# Patient Record
Sex: FEMALE | Race: WHITE | NOT HISPANIC OR LATINO | URBAN - METROPOLITAN AREA
[De-identification: names, ages, dates, MRNs, and addresses within clinical notes are randomized per-mention and may not be internally consistent; named-entity substitution may affect disease eponyms.]

---

## 2018-07-02 ENCOUNTER — OUTPATIENT (OUTPATIENT)
Dept: OUTPATIENT SERVICES | Facility: HOSPITAL | Age: 58
LOS: 1 days | End: 2018-07-02

## 2018-07-02 VITALS
RESPIRATION RATE: 14 BRPM | TEMPERATURE: 97 F | WEIGHT: 141.98 LBS | DIASTOLIC BLOOD PRESSURE: 64 MMHG | OXYGEN SATURATION: 97 % | HEIGHT: 66 IN | HEART RATE: 73 BPM | SYSTOLIC BLOOD PRESSURE: 112 MMHG

## 2018-07-02 DIAGNOSIS — Z98.890 OTHER SPECIFIED POSTPROCEDURAL STATES: Chronic | ICD-10-CM

## 2018-07-02 DIAGNOSIS — K41.30 UNILATERAL FEMORAL HERNIA, WITH OBSTRUCTION, WITHOUT GANGRENE, NOT SPECIFIED AS RECURRENT: ICD-10-CM

## 2018-07-02 DIAGNOSIS — Z90.710 ACQUIRED ABSENCE OF BOTH CERVIX AND UTERUS: Chronic | ICD-10-CM

## 2018-07-02 DIAGNOSIS — K41.90 UNILATERAL FEMORAL HERNIA, WITHOUT OBSTRUCTION OR GANGRENE, NOT SPECIFIED AS RECURRENT: ICD-10-CM

## 2018-07-02 LAB
HCT VFR BLD CALC: 32.5 % — LOW (ref 34.5–45)
HGB BLD-MCNC: 10.8 G/DL — LOW (ref 11.5–15.5)
MCHC RBC-ENTMCNC: 28.8 PG — SIGNIFICANT CHANGE UP (ref 27–34)
MCHC RBC-ENTMCNC: 33.2 % — SIGNIFICANT CHANGE UP (ref 32–36)
MCV RBC AUTO: 86.7 FL — SIGNIFICANT CHANGE UP (ref 80–100)
NRBC # FLD: 0 — SIGNIFICANT CHANGE UP
PLATELET # BLD AUTO: 365 K/UL — SIGNIFICANT CHANGE UP (ref 150–400)
PMV BLD: 9.5 FL — SIGNIFICANT CHANGE UP (ref 7–13)
RBC # BLD: 3.75 M/UL — LOW (ref 3.8–5.2)
RBC # FLD: 13.8 % — SIGNIFICANT CHANGE UP (ref 10.3–14.5)
WBC # BLD: 6.81 K/UL — SIGNIFICANT CHANGE UP (ref 3.8–10.5)
WBC # FLD AUTO: 6.81 K/UL — SIGNIFICANT CHANGE UP (ref 3.8–10.5)

## 2018-07-02 RX ORDER — LETROZOLE 2.5 MG/1
1 TABLET, FILM COATED ORAL
Qty: 0 | Refills: 0 | COMMUNITY

## 2018-07-02 NOTE — H&P PST ADULT - PROBLEM SELECTOR PLAN 1
Right Femoral Hernia Repair scheduled on 7/12/18.   Pre-op instructions provided. Pt verbalized understanding.   Pt to take own medication for GI ppx.  Chlorhexidine wash provided and instructions given. Right Femoral Hernia Repair scheduled on 7/12/18.   Pre-op instructions provided. Pt verbalized understanding.   Pt to take own medication for GI ppx.  Chlorhexidine wash provided and instructions given.  Pt states she is going for medical clearance per surgeon's request.

## 2018-07-02 NOTE — H&P PST ADULT - NSANTHOSAYNRD_GEN_A_CORE
No. ERIN screening performed.  STOP BANG Legend: 0-2 = LOW Risk; 3-4 = INTERMEDIATE Risk; 5-8 = HIGH Risk

## 2018-07-02 NOTE — H&P PST ADULT - PMH
Breast cancer  right 2015, s/p surgery and radiation  Incarcerated inguinal hernia, left 5/2013    Lumbar herniated disc

## 2018-07-02 NOTE — H&P PST ADULT - PSH
H/O abdominal hysterectomy  for prolapsed uterus  H/O lumpectomy  2015  H/O right inguinal hernia repair  2013  S/P inguinal hernia repair, open 5/11/2013 left

## 2018-07-02 NOTE — H&P PST ADULT - NEGATIVE ENMT SYMPTOMS
no ear pain/no tinnitus/no hearing difficulty/no sinus symptoms/no vertigo/no throat pain/no dysphagia

## 2018-07-02 NOTE — H&P PST ADULT - FAMILY HISTORY
Father  Still living? No  Family history of esophageal cancer, Age at diagnosis: Age Unknown     Mother  Still living? No  Family history of breast cancer, Age at diagnosis: Age Unknown

## 2018-07-02 NOTE — H&P PST ADULT - MUSCULOSKELETAL
details… detailed exam ROM intact/no joint swelling/no joint erythema/no calf tenderness/normal strength/no joint warmth

## 2018-07-02 NOTE — H&P PST ADULT - HISTORY OF PRESENT ILLNESS
57 year old female with c/o lump to right groin region in 3/2018, pt found to have femoral hernia. Pt presents today for presurgical evaluation for .. 57 year old female with c/o lump to right groin region in 3/2018, pt found to have femoral hernia. Pt presents today for presurgical evaluation for Right Femoral Hernia Repair scheduled on 7/12/18.

## 2018-07-11 ENCOUNTER — TRANSCRIPTION ENCOUNTER (OUTPATIENT)
Age: 58
End: 2018-07-11

## 2018-07-12 ENCOUNTER — OUTPATIENT (OUTPATIENT)
Dept: OUTPATIENT SERVICES | Facility: HOSPITAL | Age: 58
LOS: 1 days | Discharge: HOME | End: 2018-07-12

## 2018-07-12 VITALS
HEART RATE: 59 BPM | SYSTOLIC BLOOD PRESSURE: 124 MMHG | OXYGEN SATURATION: 100 % | DIASTOLIC BLOOD PRESSURE: 71 MMHG | HEIGHT: 66 IN | RESPIRATION RATE: 16 BRPM | TEMPERATURE: 98 F | WEIGHT: 141.98 LBS

## 2018-07-12 VITALS
DIASTOLIC BLOOD PRESSURE: 54 MMHG | TEMPERATURE: 97 F | RESPIRATION RATE: 16 BRPM | HEART RATE: 55 BPM | SYSTOLIC BLOOD PRESSURE: 102 MMHG | OXYGEN SATURATION: 100 %

## 2018-07-12 DIAGNOSIS — Z90.710 ACQUIRED ABSENCE OF BOTH CERVIX AND UTERUS: Chronic | ICD-10-CM

## 2018-07-12 DIAGNOSIS — K41.30 UNILATERAL FEMORAL HERNIA, WITH OBSTRUCTION, WITHOUT GANGRENE, NOT SPECIFIED AS RECURRENT: ICD-10-CM

## 2018-07-12 DIAGNOSIS — Z98.890 OTHER SPECIFIED POSTPROCEDURAL STATES: Chronic | ICD-10-CM

## 2018-07-12 RX ORDER — SODIUM CHLORIDE 9 MG/ML
1000 INJECTION, SOLUTION INTRAVENOUS
Qty: 0 | Refills: 0 | Status: DISCONTINUED | OUTPATIENT
Start: 2018-07-12 | End: 2018-07-13

## 2018-07-12 RX ORDER — OXYCODONE AND ACETAMINOPHEN 5; 325 MG/1; MG/1
1 TABLET ORAL
Qty: 25 | Refills: 0
Start: 2018-07-12

## 2018-07-12 NOTE — BRIEF OPERATIVE NOTE - PROCEDURE
<<-----Click on this checkbox to enter Procedure Femoral hernia repair  07/12/2018  open with mesh plug  Active  RASHMI

## 2018-07-12 NOTE — ASU DISCHARGE PLAN (ADULT/PEDIATRIC). - BATHING
shower only shower only/After 24 hours until follow up with MD. No tub baths or submerging incision in water

## 2018-07-12 NOTE — BRIEF OPERATIVE NOTE - POST-OP DX
Femoral hernia of right side without obstruction or gangrene  07/12/2018    Active  Sarahy Jorge Non-recurrent unilateral femoral hernia with obstruction without gangrene  07/12/2018    Active  Sarahy Jorge

## 2018-07-12 NOTE — ASU DISCHARGE PLAN (ADULT/PEDIATRIC). - SPECIAL INSTRUCTIONS
You may shower with steri strips on. After showering, pat dry steri strips.  Do not rub them.  They will curl up and fall off by themselves within 7 days. Avoid using creams, lotions, or ointments around the steri strip area.

## 2018-07-12 NOTE — ASU DISCHARGE PLAN (ADULT/PEDIATRIC). - LEAVE STERI-STRIP INTACT
I spoke to the patient. The prescription for her glasses was mailed today to the patient's address.  
Statement Selected

## 2018-07-12 NOTE — ASU DISCHARGE PLAN (ADULT/PEDIATRIC). - NURSING INSTRUCTIONS
You were given IV Tylenol (1000mg) for pain management in the Operating Room.  Please do NOT take tylenol/acetaminophen products (Percocet, Vicodin, Excedrin) for the next 6-8 hours (after  445PM ).  When taking pain/narcotic medicationss - take with food and know it may cause constipation. To prevent constipation increase fluids and fiber in diet. - Do NOT drive while on narcotics.   Avoid taking aspirin or ibuprophen (Motrin, Aleve, advil) until follow up with MD.  You were given IV antibiotics in the OR. IF you are prescribed oral antibiotics to take at home. Please follow directions on the bottle and start your first dose of antibiotics before bedtime, and schedule your own timing until you finish all antibiotics. DO NOT DISCONTINUE ON YOUR OWN.  Refer to medication reconcillation sheet attached with discharge instruction paperwork.

## 2018-07-12 NOTE — ASU DISCHARGE PLAN (ADULT/PEDIATRIC). - NOTIFY
Bleeding that does not stop Unable to Urinate/Bleeding that does not stop/Swelling that continues/Numbness, color, or temperature change to extremity/Fever greater than 101/Numbness, tingling/Pain not relieved by Medications/Signs of infection: redness around surgical site, hot and tender to the touch, pus drainage of any kind accompanied by foul odor

## 2018-07-12 NOTE — BRIEF OPERATIVE NOTE - PRE-OP DX
Femoral hernia of right side without obstruction or gangrene  07/12/2018    Active  Sarahy Jorge Unilateral recurrent femoral hernia with obstruction but no gangrene  07/12/2018    Active  Sarahy Jorge

## 2019-07-10 PROBLEM — C50.919 MALIGNANT NEOPLASM OF UNSPECIFIED SITE OF UNSPECIFIED FEMALE BREAST: Chronic | Status: ACTIVE | Noted: 2018-07-02

## 2019-08-05 ENCOUNTER — APPOINTMENT (OUTPATIENT)
Dept: INTERNAL MEDICINE | Facility: CLINIC | Age: 59
End: 2019-08-05
Payer: COMMERCIAL

## 2019-08-05 VITALS
WEIGHT: 143 LBS | HEIGHT: 66 IN | BODY MASS INDEX: 22.98 KG/M2 | HEART RATE: 69 BPM | TEMPERATURE: 98.2 F | OXYGEN SATURATION: 98 %

## 2019-08-05 VITALS — SYSTOLIC BLOOD PRESSURE: 114 MMHG | DIASTOLIC BLOOD PRESSURE: 70 MMHG

## 2019-08-05 DIAGNOSIS — Z78.9 OTHER SPECIFIED HEALTH STATUS: ICD-10-CM

## 2019-08-05 DIAGNOSIS — Z80.3 FAMILY HISTORY OF MALIGNANT NEOPLASM OF BREAST: ICD-10-CM

## 2019-08-05 DIAGNOSIS — Z82.49 FAMILY HISTORY OF ISCHEMIC HEART DISEASE AND OTHER DISEASES OF THE CIRCULATORY SYSTEM: ICD-10-CM

## 2019-08-05 DIAGNOSIS — Z80.0 FAMILY HISTORY OF MALIGNANT NEOPLASM OF DIGESTIVE ORGANS: ICD-10-CM

## 2019-08-05 PROCEDURE — 36415 COLL VENOUS BLD VENIPUNCTURE: CPT

## 2019-08-05 PROCEDURE — 93000 ELECTROCARDIOGRAM COMPLETE: CPT

## 2019-08-05 PROCEDURE — 99386 PREV VISIT NEW AGE 40-64: CPT | Mod: 25

## 2019-08-05 RX ORDER — CALCIUM CITRATE/VITAMIN D3 315MG-6.25
TABLET ORAL
Refills: 0 | Status: ACTIVE | COMMUNITY

## 2019-08-05 RX ORDER — LETROZOLE TABLETS 2.5 MG/1
2.5 TABLET, FILM COATED ORAL DAILY
Refills: 0 | Status: ACTIVE | COMMUNITY

## 2019-08-05 NOTE — REVIEW OF SYSTEMS
[Negative] : Heme/Lymph [FreeTextEntry6] : snore / some restless sleep [FreeTextEntry4] : atopic eczema/ uses elidyl [FreeTextEntry7] : had a colonoscopy has endoscopy  [FreeTextEntry8] : had vaginal prolapse,  [de-identified] : had melanoma eczema

## 2019-08-05 NOTE — HISTORY OF PRESENT ILLNESS
[de-identified] : doing well. watches her weight. 2md preg: baby non viable/ didn't know til gave birth. [FreeTextEntry1] : needs a new doctor

## 2019-08-05 NOTE — HEALTH RISK ASSESSMENT
[Good] : ~his/her~  mood as  good [Yes] : Yes [2 - 4 times a month (2 pts)] : 2-4 times a month (2 points) [1 or 2 (0 pts)] : 1 or 2 (0 points) [No] : In the past 12 months have you used drugs other than those required for medical reasons? No [No falls in past year] : Patient reported no falls in the past year [0] : 2) Feeling down, depressed, or hopeless: Not at all (0) [With Family] : lives with family [Employed] : employed [] :  [Feels Safe at Home] : Feels safe at home [Smoke Detector] : smoke detector [Carbon Monoxide Detector] : carbon monoxide detector [Seat Belt] :  uses seat belt [Safety elements used in home] : safety elements used in home [Sunscreen] : uses sunscreen [] : No [de-identified] : yes Oncologist  [de-identified] : walking  [[id number]] : 0 [Reports changes in hearing] : Reports no changes in hearing [Reports changes in vision] : Reports no changes in vision [Reports changes in dental health] : Reports no changes in dental health [Caregiver Concerns] : does not have caregiver concerns [TB Exposure] : is not being exposed to tuberculosis [PapSmearDate] : 07/19 [MammogramDate] : 08/18 [BoneDensityDate] : 01/17 [PapSmearComments] : pt stated wnl [ColonoscopyDate] : 01/18 [ColonoscopyComments] : repeats in in 5years

## 2019-08-05 NOTE — PHYSICAL EXAM
[Normal] : normal gait, coordination grossly intact, no focal deficits [FreeTextEntry1] : def [de-identified] : def

## 2019-08-06 LAB
ALBUMIN SERPL ELPH-MCNC: 4.4 G/DL
ALP BLD-CCNC: 41 U/L
ALT SERPL-CCNC: 14 U/L
ANION GAP SERPL CALC-SCNC: 12 MMOL/L
APPEARANCE: CLEAR
AST SERPL-CCNC: 22 U/L
BASOPHILS # BLD AUTO: 0.07 K/UL
BASOPHILS NFR BLD AUTO: 1 %
BILIRUB SERPL-MCNC: 0.3 MG/DL
BILIRUBIN URINE: NEGATIVE
BLOOD URINE: NEGATIVE
BUN SERPL-MCNC: 14 MG/DL
CALCIUM SERPL-MCNC: 9.7 MG/DL
CHLORIDE SERPL-SCNC: 104 MMOL/L
CHOLEST SERPL-MCNC: 204 MG/DL
CHOLEST/HDLC SERPL: 2.7 RATIO
CO2 SERPL-SCNC: 26 MMOL/L
COLOR: COLORLESS
CREAT SERPL-MCNC: 0.71 MG/DL
EOSINOPHIL # BLD AUTO: 0.29 K/UL
EOSINOPHIL NFR BLD AUTO: 4.2 %
ESTIMATED AVERAGE GLUCOSE: 114 MG/DL
GLUCOSE QUALITATIVE U: NEGATIVE
GLUCOSE SERPL-MCNC: 96 MG/DL
HBA1C MFR BLD HPLC: 5.6 %
HCT VFR BLD CALC: 42.2 %
HDLC SERPL-MCNC: 75 MG/DL
HGB BLD-MCNC: 12.8 G/DL
IMM GRANULOCYTES NFR BLD AUTO: 0.1 %
KETONES URINE: NEGATIVE
LDLC SERPL CALC-MCNC: 109 MG/DL
LEUKOCYTE ESTERASE URINE: NEGATIVE
LYMPHOCYTES # BLD AUTO: 2.26 K/UL
LYMPHOCYTES NFR BLD AUTO: 32.7 %
MAN DIFF?: NORMAL
MCHC RBC-ENTMCNC: 30.3 GM/DL
MCHC RBC-ENTMCNC: 31.8 PG
MCV RBC AUTO: 104.7 FL
MONOCYTES # BLD AUTO: 0.53 K/UL
MONOCYTES NFR BLD AUTO: 7.7 %
NEUTROPHILS # BLD AUTO: 3.76 K/UL
NEUTROPHILS NFR BLD AUTO: 54.3 %
NITRITE URINE: NEGATIVE
PH URINE: 7
PLATELET # BLD AUTO: 299 K/UL
POTASSIUM SERPL-SCNC: 4.6 MMOL/L
PROT SERPL-MCNC: 6.7 G/DL
PROTEIN URINE: NEGATIVE
RBC # BLD: 4.03 M/UL
RBC # FLD: 13.1 %
SODIUM SERPL-SCNC: 141 MMOL/L
SPECIFIC GRAVITY URINE: 1
TRIGL SERPL-MCNC: 99 MG/DL
TSH SERPL-ACNC: 0.83 UIU/ML
UROBILINOGEN URINE: NORMAL
WBC # FLD AUTO: 6.92 K/UL

## 2020-10-12 ENCOUNTER — NON-APPOINTMENT (OUTPATIENT)
Age: 60
End: 2020-10-12

## 2020-10-26 ENCOUNTER — APPOINTMENT (OUTPATIENT)
Dept: INTERNAL MEDICINE | Facility: CLINIC | Age: 60
End: 2020-10-26
Payer: COMMERCIAL

## 2020-10-26 VITALS
BODY MASS INDEX: 21.53 KG/M2 | DIASTOLIC BLOOD PRESSURE: 68 MMHG | HEIGHT: 66 IN | TEMPERATURE: 97.4 F | OXYGEN SATURATION: 98 % | WEIGHT: 134 LBS | HEART RATE: 68 BPM | SYSTOLIC BLOOD PRESSURE: 118 MMHG

## 2020-10-26 DIAGNOSIS — Z12.11 ENCOUNTER FOR SCREENING FOR MALIGNANT NEOPLASM OF COLON: ICD-10-CM

## 2020-10-26 DIAGNOSIS — Z78.9 OTHER SPECIFIED HEALTH STATUS: ICD-10-CM

## 2020-10-26 DIAGNOSIS — Z20.828 CONTACT WITH AND (SUSPECTED) EXPOSURE TO OTHER VIRAL COMMUNICABLE DISEASES: ICD-10-CM

## 2020-10-26 PROCEDURE — 36415 COLL VENOUS BLD VENIPUNCTURE: CPT

## 2020-10-26 PROCEDURE — 99396 PREV VISIT EST AGE 40-64: CPT | Mod: 25

## 2020-10-26 PROCEDURE — 99072 ADDL SUPL MATRL&STAF TM PHE: CPT

## 2020-10-26 NOTE — ASSESSMENT
[FreeTextEntry1] : 59 yr old presents for CPE \par \par #1 GERD- Continue PPI daily, Anti- reflux measures \par \par #2 Osteopenia- Continue Prolia , H/O FX \par Discussed possible Vacation? Speak to Specialist  Oncologist \par \par #3 H/O Breast CA- Importance of Screenings , mammo/ us \par Letrozole daily \par \par Healthy Lifestyle/ Health Screenings reviewed\par \par Labs done in office by MA/ Had Flu Vaccine already

## 2020-10-26 NOTE — HISTORY OF PRESENT ILLNESS
[FreeTextEntry1] : CPE/ Had Flu Vaccine  [de-identified] : 59 yr here for above. Reports being in good health at present. Had lower back pain in january 2020 and was seen orthopedist Dr. Calixto, had standup MRI and X-ray lumbar spine and was diagnosed with compression fracture. Did Physical therapy and took alleve for pain, Does not currently have pain.   Being followed by oncologist for Prolia shots. Diagnosed with stage 1 Breast Ca 5 years ago S/P lumpectomy and 35 rounds of radiation, currently on maintenance med Letrozole. Suffers from GERD and manages with Omeprazole.  Mammo and Sono done 3 weeks ago. Being followed by GI for GERD. Left ear feels clogged, at present. has a history of wax buildup and feels this is the case now. Being followed by dermatologist for skin checks. Received flu shot a week ago. Currently unemployed due to covid, mommy and me business.

## 2020-10-26 NOTE — HEALTH RISK ASSESSMENT
[Yes] : Yes [1 or 2 (0 pts)] : 1 or 2 (0 points) [Never (0 pts)] : Never (0 points) [No] : In the past 12 months have you used drugs other than those required for medical reasons? No [No falls in past year] : Patient reported no falls in the past year [0] : 2) Feeling down, depressed, or hopeless: Not at all (0) [Patient reported mammogram was normal] : Patient reported mammogram was normal [Patient reported bone density results were normal] : Patient reported bone density results were normal [Patient reported colonoscopy was normal] : Patient reported colonoscopy was normal [HIV test declined] : HIV test declined [Hepatitis C test offered] : Hepatitis C test offered [With Significant Other] : lives with significant other [Unemployed] : unemployed [] :  [Reports normal functional visual acuity (ie: able to read med bottle)] : Reports normal functional visual acuity [Smoke Detector] : smoke detector [Carbon Monoxide Detector] : carbon monoxide detector [Seat Belt] :  uses seat belt [Sunscreen] : uses sunscreen [Very Good] : ~his/her~ current health as very good [Good] : ~his/her~  mood as  good [4 or more  times a week (4 pts)] : 4 or more  times a week (4 points) [# Of Children ___] : has [unfilled] children [None] : None [College] : College [Sexually Active] : sexually active [Feels Safe at Home] : Feels safe at home [Fully functional (bathing, dressing, toileting, transferring, walking, feeding)] : Fully functional (bathing, dressing, toileting, transferring, walking, feeding) [Fully functional (using the telephone, shopping, preparing meals, housekeeping, doing laundry, using] : Fully functional and needs no help or supervision to perform IADLs (using the telephone, shopping, preparing meals, housekeeping, doing laundry, using transportation, managing medications and managing finances) [] : No [de-identified] : No [de-identified] : None [Audit-CScore] : 3 [de-identified] : Walking [de-identified] : Normal Diet  [Change in mental status noted] : No change in mental status noted [Language] : denies difficulty with language [Behavior] : denies difficulty with behavior [Learning/Retaining New Information] : denies difficulty learning/retaining new information [Handling Complex Tasks] : denies difficulty handling complex tasks [Reasoning] : denies difficulty with reasoning [Spatial Ability and Orientation] : denies difficulty with spatial ability and orientation [High Risk Behavior] : no high risk behavior [Reports changes in hearing] : Reports no changes in hearing [Reports changes in vision] : Reports no changes in vision [Guns at Home] : no guns at home [Travel to Developing Areas] : does not  travel to developing areas [TB Exposure] : is not being exposed to tuberculosis [Caregiver Concerns] : does not have caregiver concerns [MammogramDate] : 10/20 [BoneDensityDate] : 01/19 [ColonoscopyDate] : 01/18 [ColonoscopyComments] : Both EGD and Colonoscopy  [FreeTextEntry3] : 2

## 2020-10-26 NOTE — PHYSICAL EXAM
[No Carotid Bruits] : no carotid bruits [Pedal Pulses Present] : the pedal pulses are present [No Edema] : there was no peripheral edema [No Extremity Clubbing/Cyanosis] : no extremity clubbing/cyanosis [Declined Breast Exam] : declined breast exam  [Normal] : affect was normal and insight and judgment were intact [No Acute Distress] : no acute distress [Well Nourished] : well nourished [Well Developed] : well developed [Well-Appearing] : well-appearing [Normal Sclera/Conjunctiva] : normal sclera/conjunctiva [PERRL] : pupils equal round and reactive to light [EOMI] : extraocular movements intact [Normal Outer Ear/Nose] : the outer ears and nose were normal in appearance [Normal Oropharynx] : the oropharynx was normal [No JVD] : no jugular venous distention [No Lymphadenopathy] : no lymphadenopathy [Supple] : supple [Thyroid Normal, No Nodules] : the thyroid was normal and there were no nodules present [No Respiratory Distress] : no respiratory distress  [No Accessory Muscle Use] : no accessory muscle use [Clear to Auscultation] : lungs were clear to auscultation bilaterally [Normal Rate] : normal rate  [Regular Rhythm] : with a regular rhythm [Normal S1, S2] : normal S1 and S2 [No Murmur] : no murmur heard [No Abdominal Bruit] : a ~M bruit was not heard ~T in the abdomen [No Varicosities] : no varicosities [No Palpable Aorta] : no palpable aorta [Soft] : abdomen soft [Non Tender] : non-tender [Non-distended] : non-distended [No Masses] : no abdominal mass palpated [No HSM] : no HSM [Normal Bowel Sounds] : normal bowel sounds [Declined Rectal Exam] : declined rectal exam [Normal Posterior Cervical Nodes] : no posterior cervical lymphadenopathy [Normal Anterior Cervical Nodes] : no anterior cervical lymphadenopathy [No CVA Tenderness] : no CVA  tenderness [No Spinal Tenderness] : no spinal tenderness [No Joint Swelling] : no joint swelling [Grossly Normal Strength/Tone] : grossly normal strength/tone [No Rash] : no rash [No Skin Lesions] : no skin lesions [Coordination Grossly Intact] : coordination grossly intact [No Focal Deficits] : no focal deficits [Normal Gait] : normal gait [Deep Tendon Reflexes (DTR)] : deep tendon reflexes were 2+ and symmetric [Speech Grossly Normal] : speech grossly normal [Memory Grossly Normal] : memory grossly normal [Normal Affect] : the affect was normal [Alert and Oriented x3] : oriented to person, place, and time [Normal Mood] : the mood was normal [Normal Insight/Judgement] : insight and judgment were intact [de-identified] : left ear excessive earwax, unable to visualize TM, Right side normal TM seen [FreeTextEntry1] : FIT test with instr given  [de-identified] : sun exposed skin/  Has Derm

## 2020-10-26 NOTE — REVIEW OF SYSTEMS
[Hot Flashes] : hot flashes [Night Sweats] : night sweats [Heartburn] : heartburn [Negative] : Heme/Lymph [FreeTextEntry4] : clogged feeling in left ear [FreeTextEntry9] : stiffness in morning

## 2020-10-29 ENCOUNTER — TRANSCRIPTION ENCOUNTER (OUTPATIENT)
Age: 60
End: 2020-10-29

## 2020-11-02 LAB
25(OH)D3 SERPL-MCNC: 74.3 NG/ML
ALBUMIN SERPL ELPH-MCNC: 5 G/DL
ALP BLD-CCNC: 51 U/L
ALT SERPL-CCNC: 16 U/L
ANION GAP SERPL CALC-SCNC: 15 MMOL/L
AST SERPL-CCNC: 22 U/L
BASOPHILS # BLD AUTO: 0.11 K/UL
BASOPHILS NFR BLD AUTO: 1.4 %
BILIRUB SERPL-MCNC: 0.4 MG/DL
BUN SERPL-MCNC: 15 MG/DL
CALCIUM SERPL-MCNC: 10.3 MG/DL
CHLORIDE SERPL-SCNC: 104 MMOL/L
CHOLEST SERPL-MCNC: 239 MG/DL
CO2 SERPL-SCNC: 26 MMOL/L
CREAT SERPL-MCNC: 0.71 MG/DL
EOSINOPHIL # BLD AUTO: 0.21 K/UL
EOSINOPHIL NFR BLD AUTO: 2.7 %
ESTIMATED AVERAGE GLUCOSE: 108 MG/DL
GLUCOSE SERPL-MCNC: 98 MG/DL
HBA1C MFR BLD HPLC: 5.4 %
HCT VFR BLD CALC: 40.7 %
HDLC SERPL-MCNC: 90 MG/DL
HGB BLD-MCNC: 13 G/DL
IMM GRANULOCYTES NFR BLD AUTO: 0.1 %
LDLC SERPL CALC-MCNC: 130 MG/DL
LYMPHOCYTES # BLD AUTO: 2.56 K/UL
LYMPHOCYTES NFR BLD AUTO: 33 %
MAN DIFF?: NORMAL
MCHC RBC-ENTMCNC: 31.9 GM/DL
MCHC RBC-ENTMCNC: 31.9 PG
MCV RBC AUTO: 99.8 FL
MONOCYTES # BLD AUTO: 0.49 K/UL
MONOCYTES NFR BLD AUTO: 6.3 %
NEUTROPHILS # BLD AUTO: 4.38 K/UL
NEUTROPHILS NFR BLD AUTO: 56.5 %
NONHDLC SERPL-MCNC: 149 MG/DL
PLATELET # BLD AUTO: 327 K/UL
POTASSIUM SERPL-SCNC: 4.4 MMOL/L
PROT SERPL-MCNC: 7.3 G/DL
RBC # BLD: 4.08 M/UL
RBC # FLD: 12.6 %
SARS-COV-2 IGG SERPL IA-ACNC: 0.01 INDEX
SARS-COV-2 IGG SERPL QL IA: NEGATIVE
SODIUM SERPL-SCNC: 144 MMOL/L
TRIGL SERPL-MCNC: 94 MG/DL
TSH SERPL-ACNC: 0.98 UIU/ML
WBC # FLD AUTO: 7.76 K/UL

## 2022-03-15 ENCOUNTER — APPOINTMENT (OUTPATIENT)
Dept: INTERNAL MEDICINE | Facility: CLINIC | Age: 62
End: 2022-03-15
Payer: COMMERCIAL

## 2022-03-15 VITALS
HEIGHT: 66 IN | WEIGHT: 137 LBS | SYSTOLIC BLOOD PRESSURE: 125 MMHG | TEMPERATURE: 97.3 F | BODY MASS INDEX: 22.02 KG/M2 | OXYGEN SATURATION: 97 % | DIASTOLIC BLOOD PRESSURE: 68 MMHG | HEART RATE: 90 BPM

## 2022-03-15 DIAGNOSIS — Z12.11 ENCOUNTER FOR SCREENING FOR MALIGNANT NEOPLASM OF COLON: ICD-10-CM

## 2022-03-15 DIAGNOSIS — Z87.898 PERSONAL HISTORY OF OTHER SPECIFIED CONDITIONS: ICD-10-CM

## 2022-03-15 DIAGNOSIS — Z00.00 ENCOUNTER FOR GENERAL ADULT MEDICAL EXAMINATION W/OUT ABNORMAL FINDINGS: ICD-10-CM

## 2022-03-15 PROCEDURE — 99396 PREV VISIT EST AGE 40-64: CPT | Mod: 25

## 2022-03-15 PROCEDURE — 36415 COLL VENOUS BLD VENIPUNCTURE: CPT

## 2022-03-16 PROBLEM — Z87.898 HISTORY OF NEOPLASM OF BREAST: Status: ACTIVE | Noted: 2022-03-16

## 2022-03-16 RX ORDER — TAVABOROLE 43.5 MG/ML
5 SOLUTION TOPICAL
Qty: 10 | Refills: 0 | Status: ACTIVE | COMMUNITY
Start: 2022-01-19

## 2022-03-16 NOTE — HISTORY OF PRESENT ILLNESS
[FreeTextEntry1] : CPE [de-identified] : 61 yr old presents for above, feels well overall, has been to GI / [facility], increased PPI to 40mg po daily, reports her symptoms are definitely related to late night eating and more than usual alcohol intake. Eats healthy most days, walks daily. F/U [facility] for Breast Cancer approx 6 yrs ago.

## 2022-03-16 NOTE — HEALTH RISK ASSESSMENT
[Very Good] : ~his/her~  mood as very good [Never] : Never [Yes] : Yes [4 or more  times a week (4 pts)] : 4 or more  times a week (4 points) [1 or 2 (0 pts)] : 1 or 2 (0 points) [Never (0 pts)] : Never (0 points) [No falls in past year] : Patient reported no falls in the past year [HIV test declined] : HIV test declined [Hepatitis C test offered] : Hepatitis C test offered [None] : None [With Family] : lives with family [Employed] : employed [] :  [Sexually Active] : sexually active [Feels Safe at Home] : Feels safe at home [Fully functional (bathing, dressing, toileting, transferring, walking, feeding)] : Fully functional (bathing, dressing, toileting, transferring, walking, feeding) [Fully functional (using the telephone, shopping, preparing meals, housekeeping, doing laundry, using] : Fully functional and needs no help or supervision to perform IADLs (using the telephone, shopping, preparing meals, housekeeping, doing laundry, using transportation, managing medications and managing finances) [0] : 2) Feeling down, depressed, or hopeless: Not at all (0) [College] : College [Smoke Detector] : smoke detector [Carbon Monoxide Detector] : carbon monoxide detector [Safety elements used in home] : safety elements used in home [Seat Belt] :  uses seat belt [Sunscreen] : uses sunscreen [de-identified] : none [de-identified] : GI/ Oncology Breast Cancer  [de-identified] : Walks  [de-identified] : Regular/ Healthy  [Change in mental status noted] : No change in mental status noted [Language] : denies difficulty with language [Behavior] : denies difficulty with behavior [Learning/Retaining New Information] : denies difficulty learning/retaining new information [Handling Complex Tasks] : denies difficulty handling complex tasks [Reasoning] : denies difficulty with reasoning [Spatial Ability and Orientation] : denies difficulty with spatial ability and orientation [High Risk Behavior] : no high risk behavior [Reports changes in hearing] : Reports no changes in hearing [Reports changes in vision] : Reports no changes in vision [Reports normal functional visual acuity (ie: able to read med bottle)] : Reports poor functional visual acuity.  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

## 2022-03-16 NOTE — HISTORY OF PRESENT ILLNESS
[FreeTextEntry1] : CPE [de-identified] : 61 yr old presents for above, feels well overall, has been to GI / [facility], increased PPI to 40mg po daily, reports her symptoms are definitely related to late night eating and more than usual alcohol intake. Eats healthy most days, walks daily. F/U [facility] for Breast Cancer approx 6 yrs ago.

## 2022-03-16 NOTE — PHYSICAL EXAM
[No Acute Distress] : no acute distress [Well Nourished] : well nourished [Well Developed] : well developed [Well-Appearing] : well-appearing [Normal Sclera/Conjunctiva] : normal sclera/conjunctiva [PERRL] : pupils equal round and reactive to light [EOMI] : extraocular movements intact [Normal Outer Ear/Nose] : the outer ears and nose were normal in appearance [Normal Oropharynx] : the oropharynx was normal [No JVD] : no jugular venous distention [No Lymphadenopathy] : no lymphadenopathy [Supple] : supple [Thyroid Normal, No Nodules] : the thyroid was normal and there were no nodules present [No Respiratory Distress] : no respiratory distress  [No Accessory Muscle Use] : no accessory muscle use [Clear to Auscultation] : lungs were clear to auscultation bilaterally [Normal Rate] : normal rate  [Regular Rhythm] : with a regular rhythm [Normal S1, S2] : normal S1 and S2 [No Murmur] : no murmur heard [No Carotid Bruits] : no carotid bruits [No Abdominal Bruit] : a ~M bruit was not heard ~T in the abdomen [No Varicosities] : no varicosities [Pedal Pulses Present] : the pedal pulses are present [No Edema] : there was no peripheral edema [No Palpable Aorta] : no palpable aorta [No Extremity Clubbing/Cyanosis] : no extremity clubbing/cyanosis [Declined Breast Exam] : declined breast exam  [Soft] : abdomen soft [Non Tender] : non-tender [Non-distended] : non-distended [No Masses] : no abdominal mass palpated [No HSM] : no HSM [Normal Bowel Sounds] : normal bowel sounds [Normal Posterior Cervical Nodes] : no posterior cervical lymphadenopathy [Normal Anterior Cervical Nodes] : no anterior cervical lymphadenopathy [No CVA Tenderness] : no CVA  tenderness [No Spinal Tenderness] : no spinal tenderness [No Joint Swelling] : no joint swelling [Grossly Normal Strength/Tone] : grossly normal strength/tone [No Rash] : no rash [Coordination Grossly Intact] : coordination grossly intact [No Focal Deficits] : no focal deficits [Normal Gait] : normal gait [Deep Tendon Reflexes (DTR)] : deep tendon reflexes were 2+ and symmetric [Normal Affect] : the affect was normal [Normal Insight/Judgement] : insight and judgment were intact [Normal] : affect was normal and insight and judgment were intact [de-identified] : sun exposure noted/ Has Derm Scannings  [de-identified] : recent breast exam/ Radiology [facility]

## 2022-03-16 NOTE — HEALTH RISK ASSESSMENT
[Very Good] : ~his/her~  mood as very good [Never] : Never [Yes] : Yes [4 or more  times a week (4 pts)] : 4 or more  times a week (4 points) [1 or 2 (0 pts)] : 1 or 2 (0 points) [Never (0 pts)] : Never (0 points) [No falls in past year] : Patient reported no falls in the past year [HIV test declined] : HIV test declined [Hepatitis C test offered] : Hepatitis C test offered [None] : None [With Family] : lives with family [Employed] : employed [] :  [Sexually Active] : sexually active [Feels Safe at Home] : Feels safe at home [Fully functional (bathing, dressing, toileting, transferring, walking, feeding)] : Fully functional (bathing, dressing, toileting, transferring, walking, feeding) [Fully functional (using the telephone, shopping, preparing meals, housekeeping, doing laundry, using] : Fully functional and needs no help or supervision to perform IADLs (using the telephone, shopping, preparing meals, housekeeping, doing laundry, using transportation, managing medications and managing finances) [0] : 2) Feeling down, depressed, or hopeless: Not at all (0) [College] : College [Smoke Detector] : smoke detector [Carbon Monoxide Detector] : carbon monoxide detector [Safety elements used in home] : safety elements used in home [Seat Belt] :  uses seat belt [Sunscreen] : uses sunscreen [de-identified] : none [de-identified] : GI/ Oncology Breast Cancer  [de-identified] : Walks  [de-identified] : Regular/ Healthy  [Change in mental status noted] : No change in mental status noted [Language] : denies difficulty with language [Behavior] : denies difficulty with behavior [Learning/Retaining New Information] : denies difficulty learning/retaining new information [Handling Complex Tasks] : denies difficulty handling complex tasks [Reasoning] : denies difficulty with reasoning [Spatial Ability and Orientation] : denies difficulty with spatial ability and orientation [High Risk Behavior] : no high risk behavior [Reports changes in hearing] : Reports no changes in hearing [Reports changes in vision] : Reports no changes in vision [Reports normal functional visual acuity (ie: able to read med bottle)] : Reports poor functional visual acuity.  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

## 2022-03-16 NOTE — ASSESSMENT
[FreeTextEntry1] : 61 yr old presents CPE\par \par GERD- Encouraged to reduce Prilosec to 20mg po daily, \par Confirm with GI if can take as needed only\par Anti- REflux measures reinforced\par alcohol to minimum \par \par Osteopenia- Continue Prolia every 6 months\par Continue exercise / Weight bearing exercises\par Adequate Vit D/ Calcium intake\par \par H/O Breast Cancer- F/U [facility], Letrozole daily\par Imaging as directed \par \par Labs done in office by MA\par FIT test ordered today \par \par Mammo/ US  UTD\par Script for Shingrix given \par \par F/U Yearly, sooner if needed \par

## 2022-03-16 NOTE — PHYSICAL EXAM
[No Acute Distress] : no acute distress [Well Nourished] : well nourished [Well Developed] : well developed [Well-Appearing] : well-appearing [Normal Sclera/Conjunctiva] : normal sclera/conjunctiva [PERRL] : pupils equal round and reactive to light [EOMI] : extraocular movements intact [Normal Outer Ear/Nose] : the outer ears and nose were normal in appearance [Normal Oropharynx] : the oropharynx was normal [No JVD] : no jugular venous distention [No Lymphadenopathy] : no lymphadenopathy [Supple] : supple [Thyroid Normal, No Nodules] : the thyroid was normal and there were no nodules present [No Respiratory Distress] : no respiratory distress  [No Accessory Muscle Use] : no accessory muscle use [Clear to Auscultation] : lungs were clear to auscultation bilaterally [Normal Rate] : normal rate  [Regular Rhythm] : with a regular rhythm [Normal S1, S2] : normal S1 and S2 [No Murmur] : no murmur heard [No Carotid Bruits] : no carotid bruits [No Abdominal Bruit] : a ~M bruit was not heard ~T in the abdomen [No Varicosities] : no varicosities [Pedal Pulses Present] : the pedal pulses are present [No Edema] : there was no peripheral edema [No Palpable Aorta] : no palpable aorta [No Extremity Clubbing/Cyanosis] : no extremity clubbing/cyanosis [Declined Breast Exam] : declined breast exam  [Soft] : abdomen soft [Non Tender] : non-tender [Non-distended] : non-distended [No Masses] : no abdominal mass palpated [No HSM] : no HSM [Normal Bowel Sounds] : normal bowel sounds [Normal Posterior Cervical Nodes] : no posterior cervical lymphadenopathy [Normal Anterior Cervical Nodes] : no anterior cervical lymphadenopathy [No CVA Tenderness] : no CVA  tenderness [No Spinal Tenderness] : no spinal tenderness [No Joint Swelling] : no joint swelling [Grossly Normal Strength/Tone] : grossly normal strength/tone [No Rash] : no rash [Coordination Grossly Intact] : coordination grossly intact [No Focal Deficits] : no focal deficits [Normal Gait] : normal gait [Deep Tendon Reflexes (DTR)] : deep tendon reflexes were 2+ and symmetric [Normal Affect] : the affect was normal [Normal Insight/Judgement] : insight and judgment were intact [Normal] : affect was normal and insight and judgment were intact [de-identified] : sun exposure noted/ Has Derm Scannings  [de-identified] : recent breast exam/ Radiology [facility]

## 2022-03-23 LAB
25(OH)D3 SERPL-MCNC: 66.4 NG/ML
ALBUMIN SERPL ELPH-MCNC: 4.8 G/DL
ALP BLD-CCNC: 55 U/L
ALT SERPL-CCNC: 16 U/L
ANION GAP SERPL CALC-SCNC: 14 MMOL/L
AST SERPL-CCNC: 21 U/L
BASOPHILS # BLD AUTO: 0.13 K/UL
BASOPHILS NFR BLD AUTO: 1.7 %
BILIRUB SERPL-MCNC: 0.3 MG/DL
BUN SERPL-MCNC: 14 MG/DL
CALCIUM SERPL-MCNC: 10 MG/DL
CHLORIDE SERPL-SCNC: 102 MMOL/L
CHOLEST SERPL-MCNC: 214 MG/DL
CO2 SERPL-SCNC: 24 MMOL/L
CREAT SERPL-MCNC: 0.71 MG/DL
EGFR: 97 ML/MIN/1.73M2
EOSINOPHIL # BLD AUTO: 0.57 K/UL
EOSINOPHIL NFR BLD AUTO: 7.2 %
ESTIMATED AVERAGE GLUCOSE: 120 MG/DL
GLUCOSE SERPL-MCNC: 95 MG/DL
HBA1C MFR BLD HPLC: 5.8 %
HCT VFR BLD CALC: 41 %
HDLC SERPL-MCNC: 81 MG/DL
HGB BLD-MCNC: 13.1 G/DL
IMM GRANULOCYTES NFR BLD AUTO: 0.3 %
IRON SATN MFR SERPL: 27 %
IRON SERPL-MCNC: 107 UG/DL
LDLC SERPL CALC-MCNC: 112 MG/DL
LYMPHOCYTES # BLD AUTO: 1.92 K/UL
LYMPHOCYTES NFR BLD AUTO: 24.4 %
MAN DIFF?: NORMAL
MCHC RBC-ENTMCNC: 30.3 PG
MCHC RBC-ENTMCNC: 32 GM/DL
MCV RBC AUTO: 94.9 FL
MONOCYTES # BLD AUTO: 0.61 K/UL
MONOCYTES NFR BLD AUTO: 7.8 %
NEUTROPHILS # BLD AUTO: 4.62 K/UL
NEUTROPHILS NFR BLD AUTO: 58.6 %
NONHDLC SERPL-MCNC: 132 MG/DL
PLATELET # BLD AUTO: 321 K/UL
POTASSIUM SERPL-SCNC: 4.6 MMOL/L
PROT SERPL-MCNC: 7.3 G/DL
RBC # BLD: 4.32 M/UL
RBC # FLD: 13.9 %
SODIUM SERPL-SCNC: 140 MMOL/L
TIBC SERPL-MCNC: 389 UG/DL
TRIGL SERPL-MCNC: 99 MG/DL
TSH SERPL-ACNC: 0.59 UIU/ML
UIBC SERPL-MCNC: 282 UG/DL
VIT B12 SERPL-MCNC: 936 PG/ML
WBC # FLD AUTO: 7.87 K/UL

## 2022-03-30 ENCOUNTER — APPOINTMENT (OUTPATIENT)
Dept: HEART AND VASCULAR | Facility: CLINIC | Age: 62
End: 2022-03-30
Payer: COMMERCIAL

## 2022-03-30 VITALS — DIASTOLIC BLOOD PRESSURE: 80 MMHG | SYSTOLIC BLOOD PRESSURE: 120 MMHG | HEART RATE: 80 BPM

## 2022-03-30 DIAGNOSIS — Z85.3 PERSONAL HISTORY OF MALIGNANT NEOPLASM OF BREAST: ICD-10-CM

## 2022-03-30 DIAGNOSIS — Z92.3 PERSONAL HISTORY OF IRRADIATION: ICD-10-CM

## 2022-03-30 DIAGNOSIS — Z13.6 ENCOUNTER FOR SCREENING FOR CARDIOVASCULAR DISORDERS: ICD-10-CM

## 2022-03-30 PROCEDURE — 93000 ELECTROCARDIOGRAM COMPLETE: CPT

## 2022-03-30 PROCEDURE — 99204 OFFICE O/P NEW MOD 45 MIN: CPT | Mod: 25

## 2022-03-30 NOTE — PHYSICAL EXAM
[Well Developed] : well developed [No Acute Distress] : no acute distress [Well Nourished] : well nourished [Normal Conjunctiva] : normal conjunctiva [Normal Venous Pressure] : normal venous pressure [No Carotid Bruit] : no carotid bruit [Normal S1, S2] : normal S1, S2 [No Murmur] : no murmur [No Rub] : no rub [No Gallop] : no gallop [Clear Lung Fields] : clear lung fields [Good Air Entry] : good air entry [No Respiratory Distress] : no respiratory distress  [Soft] : abdomen soft [Non Tender] : non-tender [Normal Bowel Sounds] : normal bowel sounds [Normal Gait] : normal gait [No Edema] : no edema [No Cyanosis] : no cyanosis [No Clubbing] : no clubbing [No Rash] : no rash [Moves all extremities] : moves all extremities [Normal Speech] : normal speech [Alert and Oriented] : alert and oriented [Normal memory] : normal memory

## 2022-03-30 NOTE — REVIEW OF SYSTEMS
[Heartburn] : heartburn [Joint Pain] : joint pain [Lower Back Pain] : lower back pain [Negative] : Heme/Lymph [Abdominal Pain] : no abdominal pain [Nausea] : no nausea [Vomiting] : no vomiting [Change in Appetite] : no change in appetite [Change In The Stool] : no change in stool [Dysphagia] : no dysphagia [Diarrhea] : diarrhea [Constipation] : no constipation [Blood in Stool] : no blood in stool [Dysuria] : no dysuria

## 2022-03-30 NOTE — DISCUSSION/SUMMARY
[FreeTextEntry1] : EKG:NSR\par will have her undergo EKG ETT\par reveiwed labs done recently\par TC/LDL elevated ([id number]%)- better diet for LDL reduction\par given hx of RT will get echo

## 2022-03-30 NOTE — HISTORY OF PRESENT ILLNESS
[FreeTextEntry1] : s/p right lumpectomy in 2015 followed by RT\par advised (based on some studies) to get a stress test\par no cp/sob/palpitations

## 2022-05-27 ENCOUNTER — APPOINTMENT (OUTPATIENT)
Dept: HEART AND VASCULAR | Facility: CLINIC | Age: 62
End: 2022-05-27

## 2022-07-12 ENCOUNTER — APPOINTMENT (OUTPATIENT)
Dept: CV DIAGNOSTICS | Facility: HOSPITAL | Age: 62
End: 2022-07-12

## 2022-07-12 ENCOUNTER — OUTPATIENT (OUTPATIENT)
Dept: OUTPATIENT SERVICES | Facility: HOSPITAL | Age: 62
LOS: 1 days | End: 2022-07-12
Payer: COMMERCIAL

## 2022-07-12 DIAGNOSIS — Z90.710 ACQUIRED ABSENCE OF BOTH CERVIX AND UTERUS: Chronic | ICD-10-CM

## 2022-07-12 DIAGNOSIS — Z98.890 OTHER SPECIFIED POSTPROCEDURAL STATES: Chronic | ICD-10-CM

## 2022-07-12 DIAGNOSIS — E78.00 PURE HYPERCHOLESTEROLEMIA, UNSPECIFIED: ICD-10-CM

## 2022-07-12 PROCEDURE — 93016 CV STRESS TEST SUPVJ ONLY: CPT

## 2022-07-12 PROCEDURE — 93018 CV STRESS TEST I&R ONLY: CPT

## 2022-07-12 PROCEDURE — 93017 CV STRESS TEST TRACING ONLY: CPT

## 2023-03-11 ENCOUNTER — NON-APPOINTMENT (OUTPATIENT)
Age: 63
End: 2023-03-11

## 2023-03-17 ENCOUNTER — APPOINTMENT (OUTPATIENT)
Dept: INTERNAL MEDICINE | Facility: CLINIC | Age: 63
End: 2023-03-17
Payer: COMMERCIAL

## 2023-03-17 VITALS
OXYGEN SATURATION: 98 % | SYSTOLIC BLOOD PRESSURE: 117 MMHG | TEMPERATURE: 97.2 F | BODY MASS INDEX: 21.69 KG/M2 | DIASTOLIC BLOOD PRESSURE: 75 MMHG | HEIGHT: 66 IN | HEART RATE: 70 BPM | WEIGHT: 135 LBS

## 2023-03-17 DIAGNOSIS — M85.80 OTHER SPECIFIED DISORDERS OF BONE DENSITY AND STRUCTURE, UNSPECIFIED SITE: ICD-10-CM

## 2023-03-17 PROCEDURE — 36415 COLL VENOUS BLD VENIPUNCTURE: CPT

## 2023-03-17 PROCEDURE — 99396 PREV VISIT EST AGE 40-64: CPT | Mod: 25

## 2023-03-17 RX ORDER — DENOSUMAB 60 MG/ML
60 INJECTION SUBCUTANEOUS
Refills: 0 | Status: DISCONTINUED | COMMUNITY
End: 2023-03-17

## 2023-03-17 RX ORDER — FAMOTIDINE 20 MG/1
20 TABLET, FILM COATED ORAL DAILY
Qty: 60 | Refills: 1 | Status: ACTIVE | COMMUNITY
Start: 2023-03-17 | End: 1900-01-01

## 2023-03-17 NOTE — HEALTH RISK ASSESSMENT
[Good] : ~his/her~ current health as good [Very Good] : ~his/her~  mood as very good [Yes] : Yes [4 or more  times a week (4 pts)] : 4 or more  times a week (4 points) [1 or 2 (0 pts)] : 1 or 2 (0 points) [Never (0 pts)] : Never (0 points) [No] : In the past 12 months have you used drugs other than those required for medical reasons? No [No falls in past year] : Patient reported no falls in the past year [0] : 2) Feeling down, depressed, or hopeless: Not at all (0) [Patient reported mammogram was normal] : Patient reported mammogram was normal [With Significant Other] : lives with significant other [Retired] : retired [] :  [Fully functional (bathing, dressing, toileting, transferring, walking, feeding)] : Fully functional (bathing, dressing, toileting, transferring, walking, feeding) [Fully functional (using the telephone, shopping, preparing meals, housekeeping, doing laundry, using] : Fully functional and needs no help or supervision to perform IADLs (using the telephone, shopping, preparing meals, housekeeping, doing laundry, using transportation, managing medications and managing finances) [Smoke Detector] : smoke detector [Carbon Monoxide Detector] : carbon monoxide detector [Safety elements used in home] : safety elements used in home [Seat Belt] :  uses seat belt [Sunscreen] : uses sunscreen [Never] : Never [HIV test declined] : HIV test declined [Hepatitis C test declined] : Hepatitis C test declined [None] : None [College] : College [Sexually Active] : sexually active [Feels Safe at Home] : Feels safe at home [Reports normal functional visual acuity (ie: able to read med bottle)] : Reports normal functional visual acuity [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 [Audit-CScore] : 4 [de-identified] : walking, stationary bike [de-identified] : healthy [GCO0Hqsiw] : 0 [Change in mental status noted] : No change in mental status noted [Language] : denies difficulty with language [Behavior] : denies difficulty with behavior [Learning/Retaining New Information] : denies difficulty learning/retaining new information [Handling Complex Tasks] : denies difficulty handling complex tasks [Reasoning] : denies difficulty with reasoning [Spatial Ability and Orientation] : denies difficulty with spatial ability and orientation [High Risk Behavior] : no high risk behavior [Reports changes in hearing] : Reports no changes in hearing [Reports changes in vision] : Reports no changes in vision [Reports changes in dental health] : Reports no changes in dental health [Guns at Home] : no guns at home [Travel to Developing Areas] : does not  travel to developing areas [TB Exposure] : is not being exposed to tuberculosis [Caregiver Concerns] : does not have caregiver concerns [MammogramDate] : 10/2022 [PapSmearDate] : 02/2023 [BoneDensityDate] : 01/2022 [ColonoscopyDate] : 03/2021

## 2023-03-17 NOTE — ASSESSMENT
[FreeTextEntry1] : 62 yr old presents \par \par GERD- Continue PPI daily, add Pepcid later\par in day if needed, Anti- Reflux measures\par ++F/U EGD as scheduled+++\par \par Hypercholesteremia- Continue weight control\par low carbs, low fats, Exercise \par \par \par \par Labs done in office by MA\par \par Health Screenings discussed \par \par Needs Tdap next visit\par \par

## 2023-03-17 NOTE — HISTORY OF PRESENT ILLNESS
[FreeTextEntry1] : CPE [de-identified] : 62 yr old presents for above, feels well overall. Eats healthy most days, exercises  daily.  with 2 grown daughters. H/O Breast CA in remission , takes Letrozole daily, Followed by Oncology regular basis. Pt stopped Prolia as per Oncologist, last Bone Density very good. Pt reports Gerd symptoms, mainly night of or day after going out to dinner and overindulging in food and dessert. Takes PPI daily , but needs to take additional TUMS when GERD acting up, has Appt for EGD next week or so. Presents with lab results from Oncology , Calcium slightly elevated , will re- check today.

## 2023-03-17 NOTE — REVIEW OF SYSTEMS
[Negative] : Heme/Lymph [FreeTextEntry7] : + Acid Reflux symptoms  Tissue Cultured Epidermal Autograft Text: The defect edges were debeveled with a #15 scalpel blade.  Given the location of the defect, shape of the defect and the proximity to free margins a tissue cultured epidermal autograft was deemed most appropriate.  The graft was then trimmed to fit the size of the defect.  The graft was then placed in the primary defect and oriented appropriately.

## 2023-03-17 NOTE — PHYSICAL EXAM
[No Acute Distress] : no acute distress [Well Nourished] : well nourished [Well Developed] : well developed [Well-Appearing] : well-appearing [Normal Sclera/Conjunctiva] : normal sclera/conjunctiva [PERRL] : pupils equal round and reactive to light [EOMI] : extraocular movements intact [Normal Outer Ear/Nose] : the outer ears and nose were normal in appearance [Normal Oropharynx] : the oropharynx was normal [No JVD] : no jugular venous distention [No Lymphadenopathy] : no lymphadenopathy [Supple] : supple [Thyroid Normal, No Nodules] : the thyroid was normal and there were no nodules present [No Respiratory Distress] : no respiratory distress  [No Accessory Muscle Use] : no accessory muscle use [Clear to Auscultation] : lungs were clear to auscultation bilaterally [Normal Rate] : normal rate  [Regular Rhythm] : with a regular rhythm [Normal S1, S2] : normal S1 and S2 [No Murmur] : no murmur heard [No Carotid Bruits] : no carotid bruits [No Abdominal Bruit] : a ~M bruit was not heard ~T in the abdomen [No Varicosities] : no varicosities [Pedal Pulses Present] : the pedal pulses are present [No Edema] : there was no peripheral edema [No Palpable Aorta] : no palpable aorta [No Extremity Clubbing/Cyanosis] : no extremity clubbing/cyanosis [Declined Breast Exam] : declined breast exam  [Soft] : abdomen soft [Non Tender] : non-tender [Non-distended] : non-distended [No Masses] : no abdominal mass palpated [No HSM] : no HSM [Normal Bowel Sounds] : normal bowel sounds [Normal Posterior Cervical Nodes] : no posterior cervical lymphadenopathy [Normal Anterior Cervical Nodes] : no anterior cervical lymphadenopathy [No CVA Tenderness] : no CVA  tenderness [No Spinal Tenderness] : no spinal tenderness [No Joint Swelling] : no joint swelling [Grossly Normal Strength/Tone] : grossly normal strength/tone [No Rash] : no rash [Coordination Grossly Intact] : coordination grossly intact [No Focal Deficits] : no focal deficits [Normal Gait] : normal gait [Deep Tendon Reflexes (DTR)] : deep tendon reflexes were 2+ and symmetric [Normal Affect] : the affect was normal [Normal Insight/Judgement] : insight and judgment were intact [Normal] : affect was normal and insight and judgment were intact [de-identified] : Recent Physical breast exam done  [de-identified] : pleasant mood

## 2023-03-24 LAB
ALBUMIN SERPL ELPH-MCNC: 4.7 G/DL
ALP BLD-CCNC: 91 U/L
ALT SERPL-CCNC: 14 U/L
ANION GAP SERPL CALC-SCNC: 12 MMOL/L
AST SERPL-CCNC: 24 U/L
BILIRUB SERPL-MCNC: 0.3 MG/DL
BUN SERPL-MCNC: 16 MG/DL
CALCIUM SERPL-MCNC: 10.4 MG/DL
CHLORIDE SERPL-SCNC: 104 MMOL/L
CHOLEST SERPL-MCNC: 217 MG/DL
CO2 SERPL-SCNC: 27 MMOL/L
CREAT SERPL-MCNC: 0.68 MG/DL
EGFR: 98 ML/MIN/1.73M2
ESTIMATED AVERAGE GLUCOSE: 114 MG/DL
GLUCOSE SERPL-MCNC: 97 MG/DL
HBA1C MFR BLD HPLC: 5.6 %
HDLC SERPL-MCNC: 78 MG/DL
IRON SATN MFR SERPL: 14 %
IRON SERPL-MCNC: 57 UG/DL
LDLC SERPL CALC-MCNC: 118 MG/DL
NONHDLC SERPL-MCNC: 139 MG/DL
POTASSIUM SERPL-SCNC: 4.8 MMOL/L
PROT SERPL-MCNC: 7.1 G/DL
SODIUM SERPL-SCNC: 143 MMOL/L
TIBC SERPL-MCNC: 409 UG/DL
TRIGL SERPL-MCNC: 103 MG/DL
TSH SERPL-ACNC: 0.68 UIU/ML
UIBC SERPL-MCNC: 352 UG/DL
VIT B12 SERPL-MCNC: 930 PG/ML

## 2023-05-30 NOTE — ASU PATIENT PROFILE, ADULT - PT NEEDS ASSIST
Department of Emergency Medicine    FIRST PROVIDER TRIAGE NOTE             Independent MLP           5/30/23  5:32 PM EDT    Date of Encounter: 5/30/23   MRN: [id number]    Vitals:    05/30/23 1731   Pulse: 76   Resp: 16   Temp: 98.6 °F (37 °C)   TempSrc: Oral   Weight: 180 lb (81.6 kg)   Height: 5' 5\" (1.651 m)      HPI: Yessica Lujan is a 70 y.o. female who presents to the ED for Back Pain (Lumbar pain 5 days) and Abdominal Pain (Epigastric )     ROS: Negative for cp or sob. Physical Exam:   Gen Appearance/Constitutional: alert  CV: regular rate  GI: tender to palpation- epigastric     Initial Plan of Care: All treatment areas with department are currently occupied. Plan to order/Initiate the following while awaiting opening in ED: EKG.     Initial Plan of Care: Initiate Treatment-Testing, Proceed toTreatment Area When Bed Available for ED Attending/MLP to Continue Care    Electronically signed by Maurice Webb PA-C   DD: 5/30/23
no

## 2023-06-30 ENCOUNTER — APPOINTMENT (OUTPATIENT)
Dept: GASTROENTEROLOGY | Facility: CLINIC | Age: 63
End: 2023-06-30
Payer: COMMERCIAL

## 2023-06-30 VITALS
WEIGHT: 135 LBS | DIASTOLIC BLOOD PRESSURE: 77 MMHG | SYSTOLIC BLOOD PRESSURE: 128 MMHG | HEIGHT: 66 IN | OXYGEN SATURATION: 99 % | HEART RATE: 78 BPM | BODY MASS INDEX: 21.69 KG/M2

## 2023-06-30 PROCEDURE — 99204 OFFICE O/P NEW MOD 45 MIN: CPT

## 2023-06-30 RX ORDER — PANTOPRAZOLE 40 MG/1
40 TABLET, DELAYED RELEASE ORAL DAILY
Qty: 1 | Refills: 3 | Status: ACTIVE | COMMUNITY
Start: 2023-06-30 | End: 1900-01-01

## 2023-06-30 NOTE — ASSESSMENT
[FreeTextEntry1] : A low acid / reflux diet was discussed in great detail including not smoking, not drinking alcohol, and not\par consuming foods that irritate the esophagus. It is helpful to eat small meals throughout the day instead\par of large meals. You should avoid eating before bedtime or lying down after you eat. It can be helpful to\par raise the head of your bed six inches. Additionally, you should maintain a healthy weight and good\par posture.. The patient was given written material to take home and review.\par \par Pantoprazole 40 mg PO daily, Medication reviewed side effects and adverse reactions \par \par \par We discussed diarrhea at length. Treatment depends on the cause and severity of your diarrhea. You\par should drink plenty of fluids to avoid dehydration. You should avoid drinks that contain caffeine and\par milk. Milk may make diarrhea worse. Your doctor may recommend hydration drinks for your infant or\par child. People with severe dehydration may need fluid replacement via an IV line and hospitalization.\par Avoid eating greasy foods, fatty foods, and alcohol. Bananas, applesauce, rice, and toast are helpful\par foods to eat. If you feel too sick to eat, try sucking on ice chips until you can tolerate food.\par \par \par \par PAtient verbalized understanding of all information provided, All questions answered and reviewed.\par \par I spent 45 minutes with the patient as well as reviewing documents prior to and after the office visit\par \par

## 2023-06-30 NOTE — REVIEW OF SYSTEMS
[As Noted in HPI] : as noted in HPI [Diarrhea] : diarrhea [Heartburn] : heartburn [Bloating (gassiness)] : bloating [Negative] : Heme/Lymph

## 2023-06-30 NOTE — PHYSICAL EXAM
[Alert] : alert [Normal Voice/Communication] : normal voice/communication [Healthy Appearing] : healthy appearing [Sclera] : the sclera and conjunctiva were normal [No Acute Distress] : no acute distress [Hearing Threshold Finger Rub Not Navarro] : hearing was normal [Normal Lips/Gums] : the lips and gums were normal [Oropharynx] : the oropharynx was normal [Normal Appearance] : the appearance of the neck was normal [No Neck Mass] : no neck mass was observed [No Respiratory Distress] : no respiratory distress [No Acc Muscle Use] : no accessory muscle use [Respiration, Rhythm And Depth] : normal respiratory rhythm and effort [Auscultation Breath Sounds / Voice Sounds] : lungs were clear to auscultation bilaterally [Heart Rate And Rhythm] : heart rate was normal and rhythm regular [Normal S1, S2] : normal S1 and S2 [Murmurs] : no murmurs [Abdomen Tenderness] : non-tender [Bowel Sounds] : normal bowel sounds [No Masses] : no abdominal mass palpated [Abdomen Soft] : soft [] : no hepatosplenomegaly [Oriented To Time, Place, And Person] : oriented to person, place, and time

## 2023-06-30 NOTE — HISTORY OF PRESENT ILLNESS
[FreeTextEntry1] : 62 yr old presents for above, feels well overall. Eats healthy most days, exercises daily.  with 2 grown daughters. H/O Breast CA in remission , takes Letrozole daily, Followed by Oncology regular basis. Pt stopped Prolia as per Oncologist, last Bone Density very good. Pt reports Gerd symptoms, mainly night of or day after going out to dinner and overindulging in food and dessert. Takes PPI daily , but needs to take additional TUMS when GERD acting up, has Appt for EGD next week or so. Presents with lab results from Oncology , \par \par June 20 went to a wedding a lot during cocktail hours and developed vomiting and explosive diarrhea. She began taking Pepto Bismuth.  Had a lot of abdominal gas and bloating \par PAtient had strep throat a few weeks ago and began amoxicillin she is starting to feel slightly better than when symptoms started. \par \par Patient had a EGD in 2021 revealed small shiatski ring and small hiatal hernia \par Colonoscopy 2021 normal colon with internal hemorrhoids \par \par Denies rectal bleeding, blood in the stool, melena, or hematemesis \par

## 2023-07-16 ENCOUNTER — LABORATORY RESULT (OUTPATIENT)
Age: 63
End: 2023-07-16

## 2023-07-17 LAB — TSH SERPL-ACNC: 0.8 UIU/ML

## 2023-07-18 ENCOUNTER — APPOINTMENT (OUTPATIENT)
Dept: GASTROENTEROLOGY | Facility: CLINIC | Age: 63
End: 2023-07-18

## 2023-07-18 LAB
BARLEY IGE QN: <0.1 KUA/L
CHERRY IGE QN: 0.13 KUA/L
COW MILK IGE QN: <0.1 KUA/L
CRAB IGE QN: <0.1 KUA/L
DEPRECATED BARLEY IGE RAST QL: 0
DEPRECATED CHERRY IGE RAST QL: NORMAL
DEPRECATED COW MILK IGE RAST QL: 0
DEPRECATED CRAB IGE RAST QL: 0
DEPRECATED EGG WHITE IGE RAST QL: 0
DEPRECATED OAT IGE RAST QL: 0
DEPRECATED PEANUT IGE RAST QL: NORMAL
DEPRECATED RYE IGE RAST QL: 0
DEPRECATED SOYBEAN IGE RAST QL: 0
DEPRECATED WHEAT IGE RAST QL: 0
EGG WHITE IGE QN: <0.1 KUA/L
OAT IGE QN: <0.1 KUA/L
PEANUT IGE QN: 0.13 KUA/L
RYE IGE QN: <0.1 KUA/L
SOYBEAN IGE QN: <0.1 KUA/L
TOTAL IGE SMQN RAST: 60 KU/L
WHEAT IGE QN: <0.1 KUA/L

## 2023-07-20 LAB
CELIAC DISEASE INTERPRETATION: NORMAL
CELIAC GENE PAIRS PRESENT: YES
DQ ALPHA 1: NORMAL
DQ BETA 1: NORMAL
H PYLORI AG STL QL: NEGATIVE
HEMOCCULT STL QL IA: NEGATIVE
IMMUNOGLOBULIN A (IGA): 312 MG/DL

## 2023-07-24 LAB
CALPROTECTIN FECAL: 8 UG/G
PANCREATIC ELASTASE, FECAL: 112 CD:794062645

## 2023-07-27 ENCOUNTER — NON-APPOINTMENT (OUTPATIENT)
Age: 63
End: 2023-07-27

## 2023-07-27 LAB — LACTOFERRIN STL-MCNC: 2.58 CD:794062635

## 2023-08-01 ENCOUNTER — APPOINTMENT (OUTPATIENT)
Dept: GASTROENTEROLOGY | Facility: CLINIC | Age: 63
End: 2023-08-01
Payer: COMMERCIAL

## 2023-08-01 DIAGNOSIS — R14.0 ABDOMINAL DISTENSION (GASEOUS): ICD-10-CM

## 2023-08-01 DIAGNOSIS — R19.7 DIARRHEA, UNSPECIFIED: ICD-10-CM

## 2023-08-01 DIAGNOSIS — K21.9 GASTRO-ESOPHAGEAL REFLUX DISEASE W/OUT ESOPHAGITIS: ICD-10-CM

## 2023-08-01 PROCEDURE — 99443: CPT

## 2023-08-31 ENCOUNTER — RX RENEWAL (OUTPATIENT)
Age: 63
End: 2023-08-31

## 2023-08-31 RX ORDER — OMEPRAZOLE 20 MG/1
20 CAPSULE, DELAYED RELEASE ORAL DAILY
Qty: 90 | Refills: 1 | Status: ACTIVE | COMMUNITY
Start: 2023-08-31 | End: 1900-01-01

## 2023-10-17 ENCOUNTER — APPOINTMENT (OUTPATIENT)
Dept: GASTROENTEROLOGY | Facility: AMBULATORY MEDICAL SERVICES | Age: 63
End: 2023-10-17
Payer: COMMERCIAL

## 2023-10-17 PROCEDURE — 43239 EGD BIOPSY SINGLE/MULTIPLE: CPT

## 2023-10-19 ENCOUNTER — APPOINTMENT (OUTPATIENT)
Dept: BARIATRICS | Facility: CLINIC | Age: 63
End: 2023-10-19
Payer: COMMERCIAL

## 2023-10-19 VITALS
HEIGHT: 66 IN | HEART RATE: 75 BPM | WEIGHT: 137.3 LBS | TEMPERATURE: 98 F | BODY MASS INDEX: 22.07 KG/M2 | SYSTOLIC BLOOD PRESSURE: 129 MMHG | OXYGEN SATURATION: 98 % | DIASTOLIC BLOOD PRESSURE: 75 MMHG

## 2023-10-19 PROCEDURE — 99204 OFFICE O/P NEW MOD 45 MIN: CPT

## 2023-11-13 ENCOUNTER — APPOINTMENT (OUTPATIENT)
Dept: BARIATRICS | Facility: CLINIC | Age: 63
End: 2023-11-13
Payer: COMMERCIAL

## 2023-11-13 ENCOUNTER — OUTPATIENT (OUTPATIENT)
Dept: OUTPATIENT SERVICES | Facility: HOSPITAL | Age: 63
LOS: 1 days | Discharge: HOME | End: 2023-11-13
Payer: COMMERCIAL

## 2023-11-13 ENCOUNTER — APPOINTMENT (OUTPATIENT)
Dept: RADIOLOGY | Facility: HOSPITAL | Age: 63
End: 2023-11-13

## 2023-11-13 DIAGNOSIS — K44.9 DIAPHRAGMATIC HERNIA WITHOUT OBSTRUCTION OR GANGRENE: ICD-10-CM

## 2023-11-13 DIAGNOSIS — Z98.890 OTHER SPECIFIED POSTPROCEDURAL STATES: Chronic | ICD-10-CM

## 2023-11-13 DIAGNOSIS — Z90.710 ACQUIRED ABSENCE OF BOTH CERVIX AND UTERUS: Chronic | ICD-10-CM

## 2023-11-13 PROCEDURE — 74220 X-RAY XM ESOPHAGUS 1CNTRST: CPT | Mod: 26

## 2023-11-13 PROCEDURE — ZZZZZ: CPT

## 2023-11-15 ENCOUNTER — APPOINTMENT (OUTPATIENT)
Dept: GASTROENTEROLOGY | Facility: CLINIC | Age: 63
End: 2023-11-15
Payer: COMMERCIAL

## 2023-11-15 VITALS
SYSTOLIC BLOOD PRESSURE: 114 MMHG | WEIGHT: 137 LBS | BODY MASS INDEX: 22.02 KG/M2 | HEIGHT: 66 IN | DIASTOLIC BLOOD PRESSURE: 71 MMHG | OXYGEN SATURATION: 99 % | HEART RATE: 90 BPM | TEMPERATURE: 98 F

## 2023-11-15 DIAGNOSIS — K57.30 DIVERTICULOSIS OF LARGE INTESTINE W/OUT PERFORATION OR ABSCESS W/OUT BLEEDING: ICD-10-CM

## 2023-11-15 DIAGNOSIS — K21.9 GASTRO-ESOPHAGEAL REFLUX DISEASE W/OUT ESOPHAGITIS: ICD-10-CM

## 2023-11-15 DIAGNOSIS — Z09 ENCOUNTER FOR FOLLOW-UP EXAMINATION AFTER COMPLETED TREATMENT FOR CONDITIONS OTHER THAN MALIGNANT NEOPLASM: ICD-10-CM

## 2023-11-15 DIAGNOSIS — R05.3 CHRONIC COUGH: ICD-10-CM

## 2023-11-15 DIAGNOSIS — K64.8 OTHER HEMORRHOIDS: ICD-10-CM

## 2023-11-15 DIAGNOSIS — K44.9 DIAPHRAGMATIC HERNIA W/OUT OBSTRUCTION OR GANGRENE: ICD-10-CM

## 2023-11-15 DIAGNOSIS — K29.50 UNSPECIFIED CHRONIC GASTRITIS W/OUT BLEEDING: ICD-10-CM

## 2023-11-15 PROCEDURE — 99213 OFFICE O/P EST LOW 20 MIN: CPT

## 2023-11-15 RX ORDER — ZOLPIDEM TARTRATE 5 MG/1
5 TABLET ORAL
Qty: 15 | Refills: 0 | Status: ACTIVE | COMMUNITY
Start: 2023-11-15 | End: 1900-01-01

## 2023-11-28 ENCOUNTER — OUTPATIENT (OUTPATIENT)
Dept: OUTPATIENT SERVICES | Facility: HOSPITAL | Age: 63
LOS: 1 days | Discharge: HOME | End: 2023-11-28
Payer: COMMERCIAL

## 2023-11-28 VITALS
TEMPERATURE: 98 F | RESPIRATION RATE: 17 BRPM | WEIGHT: 138.23 LBS | HEART RATE: 78 BPM | SYSTOLIC BLOOD PRESSURE: 120 MMHG | HEIGHT: 66 IN | DIASTOLIC BLOOD PRESSURE: 69 MMHG | OXYGEN SATURATION: 97 %

## 2023-11-28 DIAGNOSIS — Z98.890 OTHER SPECIFIED POSTPROCEDURAL STATES: Chronic | ICD-10-CM

## 2023-11-28 DIAGNOSIS — Z01.818 ENCOUNTER FOR OTHER PREPROCEDURAL EXAMINATION: ICD-10-CM

## 2023-11-28 DIAGNOSIS — Z90.710 ACQUIRED ABSENCE OF BOTH CERVIX AND UTERUS: Chronic | ICD-10-CM

## 2023-11-28 DIAGNOSIS — K44.9 DIAPHRAGMATIC HERNIA WITHOUT OBSTRUCTION OR GANGRENE: ICD-10-CM

## 2023-11-28 LAB
ALBUMIN SERPL ELPH-MCNC: 3.5 G/DL — SIGNIFICANT CHANGE UP (ref 3.3–5)
ALBUMIN SERPL ELPH-MCNC: 3.5 G/DL — SIGNIFICANT CHANGE UP (ref 3.3–5)
ALP SERPL-CCNC: 103 U/L — SIGNIFICANT CHANGE UP (ref 40–120)
ALP SERPL-CCNC: 103 U/L — SIGNIFICANT CHANGE UP (ref 40–120)
ALT FLD-CCNC: 49 U/L — SIGNIFICANT CHANGE UP (ref 12–78)
ALT FLD-CCNC: 49 U/L — SIGNIFICANT CHANGE UP (ref 12–78)
ANION GAP SERPL CALC-SCNC: 5 MMOL/L — SIGNIFICANT CHANGE UP (ref 5–17)
ANION GAP SERPL CALC-SCNC: 5 MMOL/L — SIGNIFICANT CHANGE UP (ref 5–17)
AST SERPL-CCNC: 22 U/L — SIGNIFICANT CHANGE UP (ref 15–37)
AST SERPL-CCNC: 22 U/L — SIGNIFICANT CHANGE UP (ref 15–37)
BASOPHILS # BLD AUTO: 0.11 K/UL — SIGNIFICANT CHANGE UP (ref 0–0.2)
BASOPHILS # BLD AUTO: 0.11 K/UL — SIGNIFICANT CHANGE UP (ref 0–0.2)
BASOPHILS NFR BLD AUTO: 1.9 % — SIGNIFICANT CHANGE UP (ref 0–2)
BASOPHILS NFR BLD AUTO: 1.9 % — SIGNIFICANT CHANGE UP (ref 0–2)
BILIRUB SERPL-MCNC: 0.4 MG/DL — SIGNIFICANT CHANGE UP (ref 0.2–1.2)
BILIRUB SERPL-MCNC: 0.4 MG/DL — SIGNIFICANT CHANGE UP (ref 0.2–1.2)
BUN SERPL-MCNC: 12 MG/DL — SIGNIFICANT CHANGE UP (ref 7–23)
BUN SERPL-MCNC: 12 MG/DL — SIGNIFICANT CHANGE UP (ref 7–23)
CALCIUM SERPL-MCNC: 9.8 MG/DL — SIGNIFICANT CHANGE UP (ref 8.5–10.1)
CALCIUM SERPL-MCNC: 9.8 MG/DL — SIGNIFICANT CHANGE UP (ref 8.5–10.1)
CHLORIDE SERPL-SCNC: 107 MMOL/L — SIGNIFICANT CHANGE UP (ref 96–108)
CHLORIDE SERPL-SCNC: 107 MMOL/L — SIGNIFICANT CHANGE UP (ref 96–108)
CO2 SERPL-SCNC: 29 MMOL/L — SIGNIFICANT CHANGE UP (ref 22–31)
CO2 SERPL-SCNC: 29 MMOL/L — SIGNIFICANT CHANGE UP (ref 22–31)
CREAT SERPL-MCNC: 0.77 MG/DL — SIGNIFICANT CHANGE UP (ref 0.5–1.3)
CREAT SERPL-MCNC: 0.77 MG/DL — SIGNIFICANT CHANGE UP (ref 0.5–1.3)
EGFR: 87 ML/MIN/1.73M2 — SIGNIFICANT CHANGE UP
EGFR: 87 ML/MIN/1.73M2 — SIGNIFICANT CHANGE UP
EOSINOPHIL # BLD AUTO: 0.3 K/UL — SIGNIFICANT CHANGE UP (ref 0–0.5)
EOSINOPHIL # BLD AUTO: 0.3 K/UL — SIGNIFICANT CHANGE UP (ref 0–0.5)
EOSINOPHIL NFR BLD AUTO: 5.2 % — SIGNIFICANT CHANGE UP (ref 0–6)
EOSINOPHIL NFR BLD AUTO: 5.2 % — SIGNIFICANT CHANGE UP (ref 0–6)
GLUCOSE SERPL-MCNC: 97 MG/DL — SIGNIFICANT CHANGE UP (ref 70–99)
GLUCOSE SERPL-MCNC: 97 MG/DL — SIGNIFICANT CHANGE UP (ref 70–99)
HCT VFR BLD CALC: 39.7 % — SIGNIFICANT CHANGE UP (ref 34.5–45)
HCT VFR BLD CALC: 39.7 % — SIGNIFICANT CHANGE UP (ref 34.5–45)
HGB BLD-MCNC: 13.5 G/DL — SIGNIFICANT CHANGE UP (ref 11.5–15.5)
HGB BLD-MCNC: 13.5 G/DL — SIGNIFICANT CHANGE UP (ref 11.5–15.5)
IMM GRANULOCYTES NFR BLD AUTO: 0.2 % — SIGNIFICANT CHANGE UP (ref 0–0.9)
IMM GRANULOCYTES NFR BLD AUTO: 0.2 % — SIGNIFICANT CHANGE UP (ref 0–0.9)
LYMPHOCYTES # BLD AUTO: 1.93 K/UL — SIGNIFICANT CHANGE UP (ref 1–3.3)
LYMPHOCYTES # BLD AUTO: 1.93 K/UL — SIGNIFICANT CHANGE UP (ref 1–3.3)
LYMPHOCYTES # BLD AUTO: 33.3 % — SIGNIFICANT CHANGE UP (ref 13–44)
LYMPHOCYTES # BLD AUTO: 33.3 % — SIGNIFICANT CHANGE UP (ref 13–44)
MCHC RBC-ENTMCNC: 31 PG — SIGNIFICANT CHANGE UP (ref 27–34)
MCHC RBC-ENTMCNC: 31 PG — SIGNIFICANT CHANGE UP (ref 27–34)
MCHC RBC-ENTMCNC: 34 G/DL — SIGNIFICANT CHANGE UP (ref 32–36)
MCHC RBC-ENTMCNC: 34 G/DL — SIGNIFICANT CHANGE UP (ref 32–36)
MCV RBC AUTO: 91.1 FL — SIGNIFICANT CHANGE UP (ref 80–100)
MCV RBC AUTO: 91.1 FL — SIGNIFICANT CHANGE UP (ref 80–100)
MONOCYTES # BLD AUTO: 0.51 K/UL — SIGNIFICANT CHANGE UP (ref 0–0.9)
MONOCYTES # BLD AUTO: 0.51 K/UL — SIGNIFICANT CHANGE UP (ref 0–0.9)
MONOCYTES NFR BLD AUTO: 8.8 % — SIGNIFICANT CHANGE UP (ref 2–14)
MONOCYTES NFR BLD AUTO: 8.8 % — SIGNIFICANT CHANGE UP (ref 2–14)
NEUTROPHILS # BLD AUTO: 2.94 K/UL — SIGNIFICANT CHANGE UP (ref 1.8–7.4)
NEUTROPHILS # BLD AUTO: 2.94 K/UL — SIGNIFICANT CHANGE UP (ref 1.8–7.4)
NEUTROPHILS NFR BLD AUTO: 50.6 % — SIGNIFICANT CHANGE UP (ref 43–77)
NEUTROPHILS NFR BLD AUTO: 50.6 % — SIGNIFICANT CHANGE UP (ref 43–77)
NRBC # BLD: 0 /100 WBCS — SIGNIFICANT CHANGE UP (ref 0–0)
NRBC # BLD: 0 /100 WBCS — SIGNIFICANT CHANGE UP (ref 0–0)
PLATELET # BLD AUTO: 329 K/UL — SIGNIFICANT CHANGE UP (ref 150–400)
PLATELET # BLD AUTO: 329 K/UL — SIGNIFICANT CHANGE UP (ref 150–400)
POTASSIUM SERPL-MCNC: 4.9 MMOL/L — SIGNIFICANT CHANGE UP (ref 3.5–5.3)
POTASSIUM SERPL-MCNC: 4.9 MMOL/L — SIGNIFICANT CHANGE UP (ref 3.5–5.3)
POTASSIUM SERPL-SCNC: 4.9 MMOL/L — SIGNIFICANT CHANGE UP (ref 3.5–5.3)
POTASSIUM SERPL-SCNC: 4.9 MMOL/L — SIGNIFICANT CHANGE UP (ref 3.5–5.3)
PROT SERPL-MCNC: 7.4 GM/DL — SIGNIFICANT CHANGE UP (ref 6–8.3)
PROT SERPL-MCNC: 7.4 GM/DL — SIGNIFICANT CHANGE UP (ref 6–8.3)
RBC # BLD: 4.36 M/UL — SIGNIFICANT CHANGE UP (ref 3.8–5.2)
RBC # BLD: 4.36 M/UL — SIGNIFICANT CHANGE UP (ref 3.8–5.2)
RBC # FLD: 12.7 % — SIGNIFICANT CHANGE UP (ref 10.3–14.5)
RBC # FLD: 12.7 % — SIGNIFICANT CHANGE UP (ref 10.3–14.5)
SODIUM SERPL-SCNC: 141 MMOL/L — SIGNIFICANT CHANGE UP (ref 135–145)
SODIUM SERPL-SCNC: 141 MMOL/L — SIGNIFICANT CHANGE UP (ref 135–145)
WBC # BLD: 5.8 K/UL — SIGNIFICANT CHANGE UP (ref 3.8–10.5)
WBC # BLD: 5.8 K/UL — SIGNIFICANT CHANGE UP (ref 3.8–10.5)
WBC # FLD AUTO: 5.8 K/UL — SIGNIFICANT CHANGE UP (ref 3.8–10.5)
WBC # FLD AUTO: 5.8 K/UL — SIGNIFICANT CHANGE UP (ref 3.8–10.5)

## 2023-11-28 PROCEDURE — 93010 ELECTROCARDIOGRAM REPORT: CPT

## 2023-11-28 RX ORDER — LANOLIN ALCOHOL/MO/W.PET/CERES
2 CREAM (GRAM) TOPICAL
Qty: 0 | Refills: 0 | DISCHARGE

## 2023-11-28 RX ORDER — OMEPRAZOLE 10 MG/1
1 CAPSULE, DELAYED RELEASE ORAL
Qty: 0 | Refills: 0 | DISCHARGE

## 2023-11-28 NOTE — H&P PST ADULT - PROBLEM SELECTOR PLAN 1
Robotic assisted Laparoscopic hiatal hernia repair on 12/12/23. Pre op testing today.  Pre op instructions:   Hold OTC supplements.   Medical eval needed  May take Tylenol for pain or headache if needed.    NPO after 11pm to the morning of surgery.   Antibacterial wash instructions given for the morning of surgery  Patient verbalized understanding.

## 2023-11-28 NOTE — H&P PST ADULT - OPHTHALMOLOGIC
Calcipotriene Counseling:  I discussed with the patient the risks of calcipotriene including but not limited to erythema, scaling, itching, and irritation. negative

## 2023-11-28 NOTE — H&P PST ADULT - NSICDXPASTMEDICALHX_GEN_ALL_CORE_FT
PAST MEDICAL HISTORY:  Breast cancer right 2015, s/p surgery and radiation    Incarcerated inguinal hernia, left 5/2013     Lumbar herniated disc

## 2023-11-28 NOTE — H&P PST ADULT - NSICDXFAMILYHX_GEN_ALL_CORE_FT
FAMILY HISTORY:  Father  Still living? No  Family history of esophageal cancer, Age at diagnosis: Age Unknown    Mother  Still living? No  Family history of breast cancer, Age at diagnosis: Age Unknown  FH: breast cancer, Age at diagnosis: Age Unknown

## 2023-11-28 NOTE — H&P PST ADULT - HISTORY OF PRESENT ILLNESS
63 y/o female PMH of right Breast cancer 2015, on Letrazole, , with c/o heart burn for over 5 yrs on Omeprazole or pepcid and Tums as needed. Seeing  Dr Ott for the past couple of months.  had Endoscopy by Dr Brunner(GI consult) . Now scheduled for  61 y/o female PMH of right Breast cancer 2015, on Letrazole, , with c/o heart burn for over 5 yrs on Omeprazole or pepcid and Tums as needed. Seeing  Dr Ott for the past couple of months.  had Endoscopy by Dr Brunner(GI consult) . Now scheduled for  61 y/o female PMH of right Breast cancer 2015, on Letrazole, , with c/o heart burn for over 5 yrs on Omeprazole or Pepcid and Tums as needed. Seeing  Dr Ott for the past couple of months, had Endoscopy by Dr Brunner(GI consult) . Now scheduled for Robotic assisted Laparoscopic hiatal hernia repair on 12/12/23. Pre op testing today.

## 2023-11-28 NOTE — H&P PST ADULT - NSICDXPASTSURGICALHX_GEN_ALL_CORE_FT
PAST SURGICAL HISTORY:  H/O abdominal hysterectomy for prolapsed uterus    H/O lumpectomy 2015    H/O right inguinal hernia repair 2013    S/P inguinal hernia repair, open 5/11/2013 left

## 2023-12-01 ENCOUNTER — APPOINTMENT (OUTPATIENT)
Dept: INTERNAL MEDICINE | Facility: CLINIC | Age: 63
End: 2023-12-01
Payer: COMMERCIAL

## 2023-12-01 ENCOUNTER — APPOINTMENT (OUTPATIENT)
Dept: BARIATRICS | Facility: CLINIC | Age: 63
End: 2023-12-01

## 2023-12-01 VITALS
HEIGHT: 66 IN | BODY MASS INDEX: 21.86 KG/M2 | OXYGEN SATURATION: 98 % | SYSTOLIC BLOOD PRESSURE: 114 MMHG | DIASTOLIC BLOOD PRESSURE: 71 MMHG | HEART RATE: 87 BPM | WEIGHT: 136 LBS | TEMPERATURE: 98.2 F

## 2023-12-01 DIAGNOSIS — Z01.818 ENCOUNTER FOR OTHER PREPROCEDURAL EXAMINATION: ICD-10-CM

## 2023-12-01 DIAGNOSIS — K44.9 DIAPHRAGMATIC HERNIA W/OUT OBSTRUCTION OR GANGRENE: ICD-10-CM

## 2023-12-01 DIAGNOSIS — K21.9 DIAPHRAGMATIC HERNIA W/OUT OBSTRUCTION OR GANGRENE: ICD-10-CM

## 2023-12-01 DIAGNOSIS — E78.00 PURE HYPERCHOLESTEROLEMIA, UNSPECIFIED: ICD-10-CM

## 2023-12-01 DIAGNOSIS — Z23 ENCOUNTER FOR IMMUNIZATION: ICD-10-CM

## 2023-12-01 PROCEDURE — 90471 IMMUNIZATION ADMIN: CPT

## 2023-12-01 PROCEDURE — 99214 OFFICE O/P EST MOD 30 MIN: CPT | Mod: 25

## 2023-12-01 PROCEDURE — 90715 TDAP VACCINE 7 YRS/> IM: CPT

## 2023-12-02 PROBLEM — K44.9 HIATAL HERNIA WITH GERD: Status: ACTIVE | Noted: 2023-10-19

## 2023-12-02 PROBLEM — Z23 NEED FOR TDAP VACCINATION: Status: ACTIVE | Noted: 2023-12-02

## 2023-12-02 PROBLEM — Z01.818 PRE-OP EXAM: Status: ACTIVE | Noted: 2023-12-02

## 2023-12-02 PROBLEM — E78.00 PURE HYPERCHOLESTEROLEMIA: Status: ACTIVE | Noted: 2022-03-30

## 2023-12-11 ENCOUNTER — TRANSCRIPTION ENCOUNTER (OUTPATIENT)
Age: 63
End: 2023-12-11

## 2023-12-12 ENCOUNTER — TRANSCRIPTION ENCOUNTER (OUTPATIENT)
Age: 63
End: 2023-12-12

## 2023-12-12 ENCOUNTER — OUTPATIENT (OUTPATIENT)
Dept: OUTPATIENT SERVICES | Facility: HOSPITAL | Age: 63
LOS: 1 days | Discharge: HOME | End: 2023-12-12

## 2023-12-12 ENCOUNTER — APPOINTMENT (OUTPATIENT)
Dept: BARIATRICS | Facility: HOSPITAL | Age: 63
End: 2023-12-12

## 2023-12-12 DIAGNOSIS — Z98.890 OTHER SPECIFIED POSTPROCEDURAL STATES: Chronic | ICD-10-CM

## 2023-12-12 DIAGNOSIS — Z90.710 ACQUIRED ABSENCE OF BOTH CERVIX AND UTERUS: Chronic | ICD-10-CM

## 2023-12-12 RX ORDER — LETROZOLE 2.5 MG/1
1 TABLET, FILM COATED ORAL
Qty: 0 | Refills: 0 | DISCHARGE

## 2023-12-13 ENCOUNTER — TRANSCRIPTION ENCOUNTER (OUTPATIENT)
Age: 63
End: 2023-12-13

## 2023-12-13 RX ORDER — BACILLUS COAGULANS/INULIN 1B-250 MG
1 CAPSULE ORAL
Refills: 0 | DISCHARGE

## 2023-12-19 DIAGNOSIS — K21.9 GASTRO-ESOPHAGEAL REFLUX DISEASE WITHOUT ESOPHAGITIS: ICD-10-CM

## 2023-12-19 DIAGNOSIS — K44.9 DIAPHRAGMATIC HERNIA WITHOUT OBSTRUCTION OR GANGRENE: ICD-10-CM

## 2023-12-19 DIAGNOSIS — Z85.3 PERSONAL HISTORY OF MALIGNANT NEOPLASM OF BREAST: ICD-10-CM

## 2023-12-21 ENCOUNTER — APPOINTMENT (OUTPATIENT)
Dept: BARIATRICS | Facility: CLINIC | Age: 63
End: 2023-12-21
Payer: COMMERCIAL

## 2023-12-21 VITALS
BODY MASS INDEX: 21.4 KG/M2 | TEMPERATURE: 97.1 F | DIASTOLIC BLOOD PRESSURE: 70 MMHG | HEART RATE: 62 BPM | SYSTOLIC BLOOD PRESSURE: 108 MMHG | WEIGHT: 133.19 LBS | HEIGHT: 66 IN | OXYGEN SATURATION: 98 %

## 2023-12-21 PROCEDURE — 99024 POSTOP FOLLOW-UP VISIT: CPT

## 2023-12-21 NOTE — HISTORY OF PRESENT ILLNESS
[de-identified] : Pt is a 61 y/o woman with a h/o GERD, Hiatal Hernia s/p Robotic Hiatal Hernia Repair with Toupet Fundoplication on 12/12/23.  Tolerating Puree. Denies any residual symptoms of GERD.  Not taking any antacids

## 2023-12-21 NOTE — ASSESSMENT
[FreeTextEntry1] : Pt is a 63 y/o woman s/p hiatal hernia repair with mesh and Toupet Fundoplication on 12/12/23

## 2023-12-28 ENCOUNTER — APPOINTMENT (OUTPATIENT)
Dept: BARIATRICS | Facility: CLINIC | Age: 63
End: 2023-12-28
Payer: COMMERCIAL

## 2023-12-28 PROCEDURE — 99024 POSTOP FOLLOW-UP VISIT: CPT

## 2024-01-11 ENCOUNTER — APPOINTMENT (OUTPATIENT)
Dept: BARIATRICS | Facility: CLINIC | Age: 64
End: 2024-01-11
Payer: COMMERCIAL

## 2024-01-11 PROCEDURE — 99024 POSTOP FOLLOW-UP VISIT: CPT

## 2024-10-15 ENCOUNTER — NON-APPOINTMENT (OUTPATIENT)
Age: 64
End: 2024-10-15

## 2024-11-16 ENCOUNTER — APPOINTMENT (OUTPATIENT)
Dept: INTERNAL MEDICINE | Facility: CLINIC | Age: 64
End: 2024-11-16
Payer: COMMERCIAL

## 2024-11-16 ENCOUNTER — NON-APPOINTMENT (OUTPATIENT)
Age: 64
End: 2024-11-16

## 2024-11-16 VITALS
SYSTOLIC BLOOD PRESSURE: 113 MMHG | DIASTOLIC BLOOD PRESSURE: 71 MMHG | HEART RATE: 75 BPM | TEMPERATURE: 97.9 F | RESPIRATION RATE: 16 BRPM | BODY MASS INDEX: 21.38 KG/M2 | WEIGHT: 133 LBS | HEIGHT: 66 IN | OXYGEN SATURATION: 97 %

## 2024-11-16 DIAGNOSIS — G47.00 INSOMNIA, UNSPECIFIED: ICD-10-CM

## 2024-11-16 DIAGNOSIS — K44.9 DIAPHRAGMATIC HERNIA W/OUT OBSTRUCTION OR GANGRENE: ICD-10-CM

## 2024-11-16 DIAGNOSIS — R29.818 OTHER SYMPTOMS AND SIGNS INVOLVING THE NERVOUS SYSTEM: ICD-10-CM

## 2024-11-16 DIAGNOSIS — Z23 ENCOUNTER FOR IMMUNIZATION: ICD-10-CM

## 2024-11-16 DIAGNOSIS — E78.00 PURE HYPERCHOLESTEROLEMIA, UNSPECIFIED: ICD-10-CM

## 2024-11-16 DIAGNOSIS — K21.9 DIAPHRAGMATIC HERNIA W/OUT OBSTRUCTION OR GANGRENE: ICD-10-CM

## 2024-11-16 DIAGNOSIS — L30.8 OTHER SPECIFIED DERMATITIS: ICD-10-CM

## 2024-11-16 DIAGNOSIS — R06.83 SNORING: ICD-10-CM

## 2024-11-16 LAB
25(OH)D3 SERPL-MCNC: 59.4 NG/ML
ALBUMIN SERPL ELPH-MCNC: 4.2 G/DL
ALP BLD-CCNC: 47 U/L
ALT SERPL-CCNC: 17 U/L
ANION GAP SERPL CALC-SCNC: 12 MMOL/L
AST SERPL-CCNC: 18 U/L
BASOPHILS # BLD AUTO: 0.12 K/UL
BASOPHILS NFR BLD AUTO: 2.3 %
BILIRUB SERPL-MCNC: 0.2 MG/DL
BUN SERPL-MCNC: 15 MG/DL
CALCIUM SERPL-MCNC: 9 MG/DL
CHLORIDE SERPL-SCNC: 105 MMOL/L
CHOLEST SERPL-MCNC: 211 MG/DL
CO2 SERPL-SCNC: 24 MMOL/L
CREAT SERPL-MCNC: 0.58 MG/DL
EGFR: 102 ML/MIN/1.73M2
EOSINOPHIL # BLD AUTO: 0.24 K/UL
EOSINOPHIL NFR BLD AUTO: 4.6 %
GLUCOSE SERPL-MCNC: 95 MG/DL
HCT VFR BLD CALC: 38.5 %
HDLC SERPL-MCNC: 80 MG/DL
HGB BLD-MCNC: 12.5 G/DL
IMM GRANULOCYTES NFR BLD AUTO: 0.2 %
IRON SATN MFR SERPL: 24 %
IRON SERPL-MCNC: 84 UG/DL
LDLC SERPL CALC-MCNC: 118 MG/DL
LYMPHOCYTES # BLD AUTO: 1.87 K/UL
LYMPHOCYTES NFR BLD AUTO: 35.6 %
MAN DIFF?: NORMAL
MCHC RBC-ENTMCNC: 31.2 PG
MCHC RBC-ENTMCNC: 32.5 G/DL
MCV RBC AUTO: 96 FL
MONOCYTES # BLD AUTO: 0.46 K/UL
MONOCYTES NFR BLD AUTO: 8.7 %
NEUTROPHILS # BLD AUTO: 2.56 K/UL
NEUTROPHILS NFR BLD AUTO: 48.6 %
NONHDLC SERPL-MCNC: 131 MG/DL
PLATELET # BLD AUTO: 289 K/UL
POTASSIUM SERPL-SCNC: 4.6 MMOL/L
PROT SERPL-MCNC: 6.6 G/DL
RBC # BLD: 4.01 M/UL
RBC # FLD: 12.6 %
SODIUM SERPL-SCNC: 141 MMOL/L
TIBC SERPL-MCNC: 357 UG/DL
TRIGL SERPL-MCNC: 73 MG/DL
TSH SERPL-ACNC: 0.9 UIU/ML
UIBC SERPL-MCNC: 273 UG/DL
VIT B12 SERPL-MCNC: 644 PG/ML
WBC # FLD AUTO: 5.26 K/UL

## 2024-11-16 PROCEDURE — 36415 COLL VENOUS BLD VENIPUNCTURE: CPT

## 2024-11-16 PROCEDURE — 90677 PCV20 VACCINE IM: CPT

## 2024-11-16 PROCEDURE — 99396 PREV VISIT EST AGE 40-64: CPT | Mod: 25

## 2024-11-16 PROCEDURE — G0009: CPT

## 2024-11-16 RX ORDER — ZOLPIDEM TARTRATE 5 MG/1
5 TABLET ORAL
Qty: 30 | Refills: 0 | Status: ACTIVE | COMMUNITY
Start: 2024-11-16 | End: 1900-01-01

## 2024-11-16 RX ORDER — MOMETASONE FUROATE 1 MG/G
0.1 OINTMENT TOPICAL DAILY
Qty: 1 | Refills: 3 | Status: ACTIVE | COMMUNITY
Start: 2024-11-16 | End: 1900-01-01

## 2024-11-18 PROBLEM — Z23 NEED FOR PNEUMOCOCCAL VACCINATION: Status: ACTIVE | Noted: 2024-11-18
